# Patient Record
Sex: MALE | Race: ASIAN | Employment: FULL TIME | URBAN - METROPOLITAN AREA
[De-identification: names, ages, dates, MRNs, and addresses within clinical notes are randomized per-mention and may not be internally consistent; named-entity substitution may affect disease eponyms.]

---

## 2018-10-17 ENCOUNTER — HOSPITAL PATHOLOGY (OUTPATIENT)
Dept: OTHER | Facility: CLINIC | Age: 58
End: 2018-10-17

## 2018-10-18 LAB — COPATH REPORT: NORMAL

## 2020-11-03 ENCOUNTER — HOSPITAL ENCOUNTER (EMERGENCY)
Facility: CLINIC | Age: 60
Discharge: HOME | End: 2020-11-03
Attending: EMERGENCY MEDICINE | Admitting: EMERGENCY MEDICINE
Payer: COMMERCIAL

## 2020-11-03 VITALS
HEART RATE: 95 BPM | RESPIRATION RATE: 16 BRPM | DIASTOLIC BLOOD PRESSURE: 85 MMHG | OXYGEN SATURATION: 98 % | TEMPERATURE: 97.8 F | SYSTOLIC BLOOD PRESSURE: 125 MMHG

## 2020-11-03 DIAGNOSIS — K59.00 CONSTIPATION, UNSPECIFIED CONSTIPATION TYPE: ICD-10-CM

## 2020-11-03 PROCEDURE — 99283 EMERGENCY DEPT VISIT LOW MDM: CPT

## 2020-11-03 PROCEDURE — 250N000013 HC RX MED GY IP 250 OP 250 PS 637: Performed by: PHYSICIAN ASSISTANT

## 2020-11-03 RX ADMIN — DOCUSATE SODIUM 286 ML: 50 LIQUID ORAL at 16:46

## 2020-11-03 ASSESSMENT — ENCOUNTER SYMPTOMS
CHILLS: 0
CONSTIPATION: 1
BLOOD IN STOOL: 0
ABDOMINAL PAIN: 1
FEVER: 0

## 2020-11-03 NOTE — ED PROVIDER NOTES
History     Chief Complaint:  Constipation    HPI   Chu Harvey is a 60 year old male with a history of type 2 DM who presents for the evaluation of decreased stool output. He reports no BM for the last 5 days. He has been drinking more water and milk but has not taken anything over the counter. He notes abdominal cramping today. He denies any history of similar symptoms. Also denies fever, chills, bloody stool, or previous abdominal surgery.    Allergies:  No Known Drug Allergies    Medications:    Glucophage  Lipitor    Past Medical History:    Involutional ptosis of left eyelid  Sebaceous cyst  Cervical radiculopathy  Hypercholesterolemia  Thyroid nodule  Type 2 diabetes mellitus  Cervical disc herniation  Phimosis    Past Surgical History:    Circumcision    Family History:    No past pertinent family history.    Social History:  The patient was accompanied to the ED by wife.  Smoking Status: Former Smoker   Quit in 2018  Smokeless Tobacco: Never Used  Alcohol Use: Neative  Drug Use: Negative  Marital Status:       Review of Systems   Constitutional: Negative for chills and fever.   Gastrointestinal: Positive for abdominal pain (Cramping) and constipation. Negative for blood in stool.   All other systems reviewed and are negative.    Physical Exam     Patient Vitals for the past 24 hrs:   BP Temp Temp src Pulse Resp SpO2   11/03/20 1352 125/85 97.8  F (36.6  C) Oral 95 16 98 %       Physical Exam  Vitals signs and nursing note reviewed.   Eyes:      General: No scleral icterus.     Conjunctiva/sclera: Conjunctivae normal.   Cardiovascular:      Rate and Rhythm: Regular rhythm. Normal Rate.     Pulses: Normal pulses.      Heart sounds: Normal heart sounds.   Pulmonary:      Effort: Pulmonary effort is normal.      Breath sounds: Normal breath sounds.   Abdominal:      General: Abdomen is flat. Bowel sounds are normal.      Palpations: Abdomen is soft.      Tenderness: There is no abdominal tenderness.  "  Musculoskeletal: Normal range of motion.      Right lower leg: No edema.      Left lower leg: No edema.   Skin:     General: Skin is warm and dry.   Neurological:      Mental Status: Responds appropriately to questions.   Psychiatric:         Mood and Affect: Mood normal.         Behavior: Behavior normal.     Emergency Department Course     Interventions:  1646 Waterproof Lady Enema.     Emergency Department Course:  Past medical records, nursing notes, and vitals reviewed.    1625: I performed an exam of the patient as documented above.     1708: I rechecked the patient. He reports having a \"large\" BM and notes full resolution in his symptoms.  I discussed the treatment plan with the patient and spouse. They expressed understanding of this plan and consented to discharge. They will be discharged home with instructions for care and follow up. In addition, the patient will return to the emergency department if their symptoms persist, worsen, if new symptoms arise or if there is any concern.  All questions were answered.    Impression & Plan     Medical Decision Making:  Chu Harvey is a 60 year old male with a history of type 2 DM who presents for the evaluation of decreased stool output without signs of serious intraabdominal problems. Signs and symptoms are consistent with constipation. There are no signs at this point for a need for rectal disimpaction. The patient continues to have normal oral intake. There are no signs to suggest intraabdominal catastrophe. While in the ED, the patient was given a pink lady enema with significant stool output and resolution of his symptoms therefore advanced imaging was deferred at this time. The patient was discharged home with outpatient recommendations. Strict return precautions were discussed including fever, severe abdominal pain, unable to tolerate oral intake, or any other medical concerns. All questions and concerns were addressed prior to discharge.     Diagnosis:    " ICD-10-CM    1. Constipation, unspecified constipation type  K59.00        Disposition:  Discharged to home.    Discharge Medications:  New Prescriptions    No medications on file       Scribe Disclosure:  I, Peg Oscar, am serving as a scribe at 4:27 PM on 11/3/2020 to document services personally performed by Rosemary Higuera PA-C based on my observations and the provider's statements to me.            Rosemary Higuera PA-C  11/03/20 1716

## 2020-11-03 NOTE — ED AVS SNAPSHOT
[facility] Emergency Dept  [address]  [facility] [address]  Phone: [phone]  Fax: [phone]                                    Chu Harvey   MRN: [id number]    Department: [facility] Emergency Dept   Date of Visit: 11/3/2020           After Visit Summary Signature Page    I have received my discharge instructions, and my questions have been answered. I have discussed any challenges I see with this plan with the nurse or doctor.    ..........................................................................................................................................  Patient/Patient Representative Signature      ..........................................................................................................................................  Patient Representative Print Name and Relationship to Patient    ..................................................               ................................................  Date                                   Time    ..........................................................................................................................................  Reviewed by Signature/Title    ...................................................              ..............................................  Date                                               Time          22EPIC Rev 08/18

## 2020-11-03 NOTE — DISCHARGE INSTRUCTIONS
Please review attached instructions. You may use Metamucil daily.     Return to the ED if you experience severe abdominal pain, fever, blood in your stool, unable to tolerate oral intake, or any other medical concerns.

## 2020-12-05 ENCOUNTER — NURSE TRIAGE (OUTPATIENT)
Dept: NURSING | Facility: CLINIC | Age: 60
End: 2020-12-05

## 2020-12-06 NOTE — TELEPHONE ENCOUNTER
Caller is wife; states patient  felt very weak; BP 80/  and his heart was beating very fast; right now  BP is 107 and  and feels slightly better; similar episode last evening but became diaphoretic and  weak during  Episode; Has mild chest pain   Caller is instructed to have patient lay supine and call 911    Reason for Disposition    [1] Systolic BP < 90 AND [2] dizzy, lightheaded, or weak    Protocols used: LOW BLOOD PRESSURE-A-AH

## 2020-12-07 ENCOUNTER — HOSPITAL ENCOUNTER (INPATIENT)
Facility: CLINIC | Age: 60
LOS: 2 days | Discharge: HOME | End: 2020-12-09
Attending: EMERGENCY MEDICINE | Admitting: STUDENT IN AN ORGANIZED HEALTH CARE EDUCATION/TRAINING PROGRAM
Payer: COMMERCIAL

## 2020-12-07 ENCOUNTER — APPOINTMENT (OUTPATIENT)
Dept: GENERAL RADIOLOGY | Facility: CLINIC | Age: 60
End: 2020-12-07
Attending: EMERGENCY MEDICINE
Payer: COMMERCIAL

## 2020-12-07 DIAGNOSIS — K92.2 GASTROINTESTINAL HEMORRHAGE, UNSPECIFIED GASTROINTESTINAL HEMORRHAGE TYPE: Primary | ICD-10-CM

## 2020-12-07 DIAGNOSIS — R07.9 CHEST PAIN, UNSPECIFIED TYPE: ICD-10-CM

## 2020-12-07 LAB
ABO + RH BLD: NORMAL
ABO + RH BLD: NORMAL
ALBUMIN SERPL-MCNC: 3.4 G/DL (ref 3.4–5)
ALP SERPL-CCNC: 39 U/L (ref 40–150)
ALT SERPL W P-5'-P-CCNC: 24 U/L (ref 0–70)
ANION GAP SERPL CALCULATED.3IONS-SCNC: 8 MMOL/L (ref 3–14)
APTT PPP: 22 SEC (ref 22–37)
AST SERPL W P-5'-P-CCNC: 15 U/L (ref 0–45)
BASOPHILS # BLD AUTO: 0.1 10E9/L (ref 0–0.2)
BASOPHILS NFR BLD AUTO: 0.7 %
BILIRUB DIRECT SERPL-MCNC: 0.1 MG/DL (ref 0–0.2)
BILIRUB SERPL-MCNC: 0.4 MG/DL (ref 0.2–1.3)
BLD GP AB SCN SERPL QL: NORMAL
BLD PROD TYP BPU: NORMAL
BLD UNIT ID BPU: 0
BLD UNIT ID BPU: 0
BLOOD BANK CMNT PATIENT-IMP: NORMAL
BLOOD PRODUCT CODE: NORMAL
BLOOD PRODUCT CODE: NORMAL
BPU ID: NORMAL
BPU ID: NORMAL
BUN SERPL-MCNC: 20 MG/DL (ref 7–30)
CALCIUM SERPL-MCNC: 8.8 MG/DL (ref 8.5–10.1)
CHLORIDE SERPL-SCNC: 102 MMOL/L (ref 94–109)
CO2 SERPL-SCNC: 26 MMOL/L (ref 20–32)
CREAT SERPL-MCNC: 0.77 MG/DL (ref 0.66–1.25)
DIFFERENTIAL METHOD BLD: ABNORMAL
EOSINOPHIL # BLD AUTO: 0.1 10E9/L (ref 0–0.7)
EOSINOPHIL NFR BLD AUTO: 0.4 %
ERYTHROCYTE [DISTWIDTH] IN BLOOD BY AUTOMATED COUNT: 13.4 % (ref 10–15)
GFR SERPL CREATININE-BSD FRML MDRD: >90 ML/MIN/{1.73_M2}
GLUCOSE SERPL-MCNC: 116 MG/DL (ref 70–99)
HCT VFR BLD AUTO: 19.7 % (ref 40–53)
HGB BLD-MCNC: 6.6 G/DL (ref 13.3–17.7)
IMM GRANULOCYTES # BLD: 0.1 10E9/L (ref 0–0.4)
IMM GRANULOCYTES NFR BLD: 1.2 %
INR PPP: 0.97 (ref 0.86–1.14)
INTERPRETATION ECG - MUSE: NORMAL
LYMPHOCYTES # BLD AUTO: 3.4 10E9/L (ref 0.8–5.3)
LYMPHOCYTES NFR BLD AUTO: 28.4 %
MAGNESIUM SERPL-MCNC: 2.4 MG/DL (ref 1.6–2.3)
MCH RBC QN AUTO: 31.1 PG (ref 26.5–33)
MCHC RBC AUTO-ENTMCNC: 33.5 G/DL (ref 31.5–36.5)
MCV RBC AUTO: 93 FL (ref 78–100)
MONOCYTES # BLD AUTO: 0.7 10E9/L (ref 0–1.3)
MONOCYTES NFR BLD AUTO: 6.1 %
NEUTROPHILS # BLD AUTO: 7.5 10E9/L (ref 1.6–8.3)
NEUTROPHILS NFR BLD AUTO: 63.2 %
NRBC # BLD AUTO: 0.4 10*3/UL
NRBC BLD AUTO-RTO: 3 /100
NUM BPU REQUESTED: 2
PLATELET # BLD AUTO: 247 10E9/L (ref 150–450)
POTASSIUM SERPL-SCNC: 3.4 MMOL/L (ref 3.4–5.3)
POTASSIUM SERPL-SCNC: 3.5 MMOL/L (ref 3.4–5.3)
PROT SERPL-MCNC: 6.4 G/DL (ref 6.8–8.8)
RBC # BLD AUTO: 2.12 10E12/L (ref 4.4–5.9)
SARS-COV-2 RNA SPEC QL NAA+PROBE: NORMAL
SODIUM SERPL-SCNC: 136 MMOL/L (ref 133–144)
SPECIMEN EXP DATE BLD: NORMAL
SPECIMEN SOURCE: NORMAL
TRANSFUSION STATUS PATIENT QL: NORMAL
TROPONIN I SERPL-MCNC: <0.015 UG/L (ref 0–0.04)
WBC # BLD AUTO: 11.9 10E9/L (ref 4–11)

## 2020-12-07 PROCEDURE — 250N000013 HC RX MED GY IP 250 OP 250 PS 637: Performed by: STUDENT IN AN ORGANIZED HEALTH CARE EDUCATION/TRAINING PROGRAM

## 2020-12-07 PROCEDURE — 93005 ELECTROCARDIOGRAM TRACING: CPT

## 2020-12-07 PROCEDURE — 86900 BLOOD TYPING SEROLOGIC ABO: CPT | Performed by: EMERGENCY MEDICINE

## 2020-12-07 PROCEDURE — 36430 TRANSFUSION BLD/BLD COMPNT: CPT

## 2020-12-07 PROCEDURE — 85730 THROMBOPLASTIN TIME PARTIAL: CPT | Performed by: EMERGENCY MEDICINE

## 2020-12-07 PROCEDURE — 85610 PROTHROMBIN TIME: CPT | Performed by: EMERGENCY MEDICINE

## 2020-12-07 PROCEDURE — 84132 ASSAY OF SERUM POTASSIUM: CPT | Performed by: STUDENT IN AN ORGANIZED HEALTH CARE EDUCATION/TRAINING PROGRAM

## 2020-12-07 PROCEDURE — 80053 COMPREHEN METABOLIC PANEL: CPT | Performed by: EMERGENCY MEDICINE

## 2020-12-07 PROCEDURE — 86850 RBC ANTIBODY SCREEN: CPT | Performed by: EMERGENCY MEDICINE

## 2020-12-07 PROCEDURE — 36415 COLL VENOUS BLD VENIPUNCTURE: CPT | Performed by: STUDENT IN AN ORGANIZED HEALTH CARE EDUCATION/TRAINING PROGRAM

## 2020-12-07 PROCEDURE — 99285 EMERGENCY DEPT VISIT HI MDM: CPT | Mod: 25

## 2020-12-07 PROCEDURE — 250N000011 HC RX IP 250 OP 636: Performed by: EMERGENCY MEDICINE

## 2020-12-07 PROCEDURE — U0003 INFECTIOUS AGENT DETECTION BY NUCLEIC ACID (DNA OR RNA); SEVERE ACUTE RESPIRATORY SYNDROME CORONAVIRUS 2 (SARS-COV-2) (CORONAVIRUS DISEASE [COVID-19]), AMPLIFIED PROBE TECHNIQUE, MAKING USE OF HIGH THROUGHPUT TECHNOLOGIES AS DESCRIBED BY CMS-2020-01-R: HCPCS | Performed by: EMERGENCY MEDICINE

## 2020-12-07 PROCEDURE — C9113 INJ PANTOPRAZOLE SODIUM, VIA: HCPCS | Performed by: EMERGENCY MEDICINE

## 2020-12-07 PROCEDURE — 96374 THER/PROPH/DIAG INJ IV PUSH: CPT

## 2020-12-07 PROCEDURE — C9803 HOPD COVID-19 SPEC COLLECT: HCPCS

## 2020-12-07 PROCEDURE — 86923 COMPATIBILITY TEST ELECTRIC: CPT | Performed by: EMERGENCY MEDICINE

## 2020-12-07 PROCEDURE — 86901 BLOOD TYPING SEROLOGIC RH(D): CPT | Performed by: EMERGENCY MEDICINE

## 2020-12-07 PROCEDURE — 99207 PR CDG-MDM COMPONENT: MEETS HIGH - UP CODED: CPT | Performed by: STUDENT IN AN ORGANIZED HEALTH CARE EDUCATION/TRAINING PROGRAM

## 2020-12-07 PROCEDURE — 84484 ASSAY OF TROPONIN QUANT: CPT | Performed by: EMERGENCY MEDICINE

## 2020-12-07 PROCEDURE — 120N000001 HC R&B MED SURG/OB

## 2020-12-07 PROCEDURE — P9016 RBC LEUKOCYTES REDUCED: HCPCS | Performed by: EMERGENCY MEDICINE

## 2020-12-07 PROCEDURE — 82248 BILIRUBIN DIRECT: CPT | Performed by: EMERGENCY MEDICINE

## 2020-12-07 PROCEDURE — 83735 ASSAY OF MAGNESIUM: CPT | Performed by: STUDENT IN AN ORGANIZED HEALTH CARE EDUCATION/TRAINING PROGRAM

## 2020-12-07 PROCEDURE — 85025 COMPLETE CBC W/AUTO DIFF WBC: CPT | Performed by: EMERGENCY MEDICINE

## 2020-12-07 PROCEDURE — 99223 1ST HOSP IP/OBS HIGH 75: CPT | Mod: AI | Performed by: STUDENT IN AN ORGANIZED HEALTH CARE EDUCATION/TRAINING PROGRAM

## 2020-12-07 PROCEDURE — 71045 X-RAY EXAM CHEST 1 VIEW: CPT

## 2020-12-07 RX ORDER — LIDOCAINE 40 MG/G
CREAM TOPICAL
Status: DISCONTINUED | OUTPATIENT
Start: 2020-12-07 | End: 2020-12-09 | Stop reason: HOSPADM

## 2020-12-07 RX ORDER — ONDANSETRON 4 MG/1
4 TABLET, ORALLY DISINTEGRATING ORAL EVERY 6 HOURS PRN
Status: DISCONTINUED | OUTPATIENT
Start: 2020-12-07 | End: 2020-12-09 | Stop reason: HOSPADM

## 2020-12-07 RX ORDER — SODIUM CHLORIDE 9 MG/ML
INJECTION, SOLUTION INTRAVENOUS CONTINUOUS
Status: DISCONTINUED | OUTPATIENT
Start: 2020-12-07 | End: 2020-12-09 | Stop reason: HOSPADM

## 2020-12-07 RX ORDER — ATORVASTATIN CALCIUM 20 MG/1
20 TABLET, FILM COATED ORAL
COMMUNITY

## 2020-12-07 RX ORDER — POTASSIUM CHLORIDE 1500 MG/1
40 TABLET, EXTENDED RELEASE ORAL ONCE
Status: COMPLETED | OUTPATIENT
Start: 2020-12-07 | End: 2020-12-07

## 2020-12-07 RX ORDER — METFORMIN HCL 500 MG
500 TABLET, EXTENDED RELEASE 24 HR ORAL 2 TIMES DAILY WITH MEALS
COMMUNITY

## 2020-12-07 RX ORDER — ONDANSETRON 2 MG/ML
4 INJECTION INTRAMUSCULAR; INTRAVENOUS EVERY 6 HOURS PRN
Status: DISCONTINUED | OUTPATIENT
Start: 2020-12-07 | End: 2020-12-09 | Stop reason: HOSPADM

## 2020-12-07 RX ORDER — ACETAMINOPHEN 325 MG/1
650 TABLET ORAL EVERY 4 HOURS PRN
Status: DISCONTINUED | OUTPATIENT
Start: 2020-12-07 | End: 2020-12-09 | Stop reason: HOSPADM

## 2020-12-07 RX ORDER — ATORVASTATIN CALCIUM 20 MG/1
20 TABLET, FILM COATED ORAL
Status: DISCONTINUED | OUTPATIENT
Start: 2020-12-08 | End: 2020-12-09 | Stop reason: HOSPADM

## 2020-12-07 RX ADMIN — PANTOPRAZOLE SODIUM 40 MG: 40 INJECTION, POWDER, FOR SOLUTION INTRAVENOUS at 16:54

## 2020-12-07 RX ADMIN — POTASSIUM CHLORIDE 40 MEQ: 1500 TABLET, EXTENDED RELEASE ORAL at 21:11

## 2020-12-07 SDOH — HEALTH STABILITY: MENTAL HEALTH: HOW OFTEN DO YOU HAVE 6 OR MORE DRINKS ON ONE OCCASION?: NEVER

## 2020-12-07 SDOH — HEALTH STABILITY: MENTAL HEALTH: HOW MANY STANDARD DRINKS CONTAINING ALCOHOL DO YOU HAVE ON A TYPICAL DAY?: NOT ASKED

## 2020-12-07 SDOH — HEALTH STABILITY: MENTAL HEALTH: HOW OFTEN DO YOU HAVE A DRINK CONTAINING ALCOHOL?: NEVER

## 2020-12-07 ASSESSMENT — ACTIVITIES OF DAILY LIVING (ADL)
PATIENT_/_FAMILY_COMMUNICATION_STYLE: SPOKEN LANGUAGE (NON-ENGLISH)
DRESSING/BATHING_DIFFICULTY: NO
ADLS_ACUITY_SCORE: 17
HEARING_DIFFICULTY_OR_DEAF: NO
CONCENTRATING,_REMEMBERING_OR_MAKING_DECISIONS_DIFFICULTY: NO
HEARING_DIFFICULTY_OR_DEAF: NO
DIFFICULTY_EATING/SWALLOWING: NO
TOILETING_ISSUES: NO
HEARING_DIFFICULTY_OR_DEAF: NO
WEAR_GLASSES_OR_BLIND: OTHER (SEE COMMENTS)
DOING_ERRANDS_INDEPENDENTLY_DIFFICULTY: NO
FALL_HISTORY_WITHIN_LAST_SIX_MONTHS: NO
DIFFICULTY_COMMUNICATING: NO
INTERPRETER_SERVICES_OFFERED_TO_THE_PATIENT: YES
WALKING_OR_CLIMBING_STAIRS_DIFFICULTY: NO
PATIENT_/_FAMILY_COMMUNICATION_STYLE: SPOKEN LANGUAGE (NON-ENGLISH)

## 2020-12-07 ASSESSMENT — ENCOUNTER SYMPTOMS
FEVER: 0
VOMITING: 0
ABDOMINAL PAIN: 0
PALPITATIONS: 1
COUGH: 0
LIGHT-HEADEDNESS: 0
SHORTNESS OF BREATH: 1
NAUSEA: 0
BLOOD IN STOOL: 1
NUMBNESS: 0
WEAKNESS: 1
CHILLS: 0

## 2020-12-07 ASSESSMENT — MIFFLIN-ST. JEOR: SCORE: 1454.03

## 2020-12-07 NOTE — ED PROVIDER NOTES
History   Chief Complaint:  Generalized Weakness and Chest Pain    The patient's spouse was acting as  during the interview.     TESS Harvey is a 60 year old male, with a history of DM II and hyperlipidemia, who presents to the ED for evaluation of generalized weakness and chest pain. The patient reports he saw black hued stools for the past week and went into his clinic today to be evaluated. When he was in the clinic, he also stated he had some exertional chest pain along with increasing weakness and shortness of breath. He was then sent to the emergency department for further evaluation. Here in the emergency department, the patient states his chest pain began three days ago and is intermittent in nature. It is localized on the left side of his chest and does not radiate elsewhere. The patient also mentions, when he was going up a few stairs, he had some increased shortness of breath, noticed palpitations, and had some minor chest pain as well. He has not had symptoms like this in the past. The patient's black stools have been going on for a week as well. He has not had an endoscopy in the past nor has he had GI symptoms like this in the past. The patient is COVID-19 tested every Tuesday for his work and has been negative every time. The patient denies any hematemesis, fever chills, cough, abdominal pain, nausea, vomiting, numbness, lightheadedness, or any other acute symptoms.     Allergies:  No known drug allergies    Medications:    Metformin  Lipitor  Aspirin    Past Medical History:    Hyperlipidemia  DM II    Past Surgical History:    Circumcision    Family History:    Lung cancer    Social History:  Smoking status: Never  Alcohol use: Not currently  Drug use: Not currently  PCP: [facility]  Marital Status:   [2]    Review of Systems   Constitutional: Negative for chills and fever.   Respiratory: Positive for shortness of breath. Negative for cough.     Cardiovascular: Positive for chest pain and palpitations.   Gastrointestinal: Positive for blood in stool. Negative for abdominal pain, nausea and vomiting.   Neurological: Positive for weakness. Negative for light-headedness and numbness.   All other systems reviewed and are negative.    Physical Exam     Patient Vitals for the past 24 hrs:   BP Temp Temp src Pulse Resp SpO2   12/07/20 1921 122/72 98  F (36.7  C) Oral 91 16 99 %   12/07/20 1903 126/70 98.1  F (36.7  C) Oral 90 14 99 %   12/07/20 1852 135/71 98.1  F (36.7  C) Oral 91 18 99 %   12/07/20 1830 138/76 -- -- 89 14 99 %   12/07/20 1800 121/71 -- -- 90 14 99 %   12/07/20 1730 122/71 -- -- 89 18 100 %   12/07/20 1700 117/71 -- -- 88 14 99 %   12/07/20 1632 137/77 98.1  F (36.7  C) Oral 90 18 99 %       Physical Exam  Vitals signs and nursing note reviewed.   Constitutional:       Comments: Pale appearing   HENT:      Head: Normocephalic.      Nose: Nose normal.      Mouth/Throat:      Mouth: Mucous membranes are moist.      Pharynx: Oropharynx is clear.   Eyes:      Pupils: Pupils are equal, round, and reactive to light.   Cardiovascular:      Rate and Rhythm: Normal rate and regular rhythm.   Pulmonary:      Effort: Pulmonary effort is normal.      Breath sounds: Normal breath sounds.   Abdominal:      General: Abdomen is flat.   Musculoskeletal: Normal range of motion.   Skin:     General: Skin is warm.      Capillary Refill: Capillary refill takes less than 2 seconds.   Neurological:      General: No focal deficit present.      Mental Status: He is alert.   Psychiatric:         Mood and Affect: Mood normal.           Emergency Department Course   ECG (16:54:49):  Rate 87 bpm. GA interval 154. QRS duration 90. QT/QTc 358/430. P-R-T axes 68 61 52. Normal sins rhythm. Normal ECG. Interpreted at 1704 by Steven Puga MD.    Imaging:  Radiology findings were communicated with the patient who voiced understanding of the findings.    XR Chest,  portable, 1 view:  No acute findings. The lungs are clear and there are no   pleural effusions. Normal heart size.    Imaging independently reviewed and agree with radiologist interpretation.      Laboratory:  Laboratory findings were communicated with the patient who voiced understanding of the findings.    CBC: WBC 11.9 (H), HGB 6.6 (LL), o/w WNL ()    CMP:  (H), Protein Total 6.4 (L), ALKPHOS 39 (L), o/w WNL (Creatinine 0.77)    PTT: 22    INR: 0.97    Troponin (Collected 1644): <0.015     Bilirubin Direct: 0.1    AB0/Rh type and screen: AB Rh+. Antibodies Negative    COVID-19 Virus PCR: Pending    Interventions:  1654: Protonix 40 mg IV    Emergency Department Course:  Past medical records, nursing notes, and vitals reviewed.    1649 I performed an exam of the patient as documented above.     EKG obtained in the ED, see results above.   IV was inserted and blood was drawn for laboratory testing, results above.  The patient was sent for a chest x-ray while in the emergency department, results above.     1730 I rechecked the patient and discussed the results of his workup thus far.     Findings and plan explained to the Patient who consents to admission. Discussed the patient with Dr. Ball, who will admit the patient to a medical bed for further monitoring, evaluation, and treatment.    I personally reviewed the laboratory and imaging results with the Patient and answered all related questions prior to admission.     Impression & Plan   Covid-19  Chu Harvey was evaluated during a global COVID-19 pandemic, which necessitated consideration that the patient might be at risk for infection with the SARS-CoV-2 virus that causes COVID-19.   Applicable protocols for evaluation were followed during the patient's care.   COVID-19 was considered as part of the patient's evaluation. The plan for testing is:  a test was obtained during this visit.    Medical Decision Making:  Patient presents with black stool  for a number of days.  Also some vague chest pain that now has resolved.  EKG and troponin are negative.  Doubt pulmonary embolism clinical suspicions are for shortness of breath with exertion likely secondary to proximal pretty significant anemia.  Prior hemoglobins have been normal today is only 6.6 with a normal MCV.  Patient was ordered 2 units of blood I did discuss with him risks and benefits of blood transfusion he did agree to this and was signed consent patient be admitted for IV transfusion and GI consultation for likely endoscopy.  Care discussed with the hospitalist.    Diagnosis:    ICD-10-CM    1. Gastrointestinal hemorrhage, unspecified gastrointestinal hemorrhage type  K92.2 CBC with platelets differential     ABO/Rh type and screen     Asymptomatic COVID-19 Virus (Coronavirus) by PCR     Blood component     Blood component     Blood component     Blood component   2. Chest pain, unspecified type  R07.9        Disposition:  Admitted to a medical bed.    Scribe Disclosure:  Dave LOBO, am serving as a scribe at 4:49 PM on 12/7/2020 to document services personally performed by Steven Puga MD based on my observations and the provider's statements to me.        Steven Puga MD  12/07/20 7470

## 2020-12-07 NOTE — ED TRIAGE NOTES
Patient comes in from a clinic for eval of chest pain.  Patient has a history of low hemoglobin and was following up for SOB and weakness on exertion.  Patient states he also noticed some black stools since Friday.  NSR on EKG for EMS.  Clinic sent patient here for chest pain rule out.

## 2020-12-07 NOTE — ED NOTES
Bed: ED21  Expected date:   Expected time:   Means of arrival:   Comments:  E2  60M Possible GI bleed  No covid  1286

## 2020-12-07 NOTE — ED NOTES
DATE:  12/7/2020   TIME OF RECEIPT FROM LAB:  5:31 PM  LAB TEST:  Hemoglobin  LAB VALUE:  6.6  RESULTS GIVEN WITH READ-BACK TO (PROVIDER):  Verbally reported to Dr. Puga  TIME LAB VALUE REPORTED TO PROVIDER:   5:31 PM

## 2020-12-08 ENCOUNTER — APPOINTMENT (OUTPATIENT)
Dept: INTERPRETER SERVICES | Facility: CLINIC | Age: 60
End: 2020-12-08
Payer: COMMERCIAL

## 2020-12-08 LAB
ANION GAP SERPL CALCULATED.3IONS-SCNC: 2 MMOL/L (ref 3–14)
BUN SERPL-MCNC: 14 MG/DL (ref 7–30)
CALCIUM SERPL-MCNC: 8.1 MG/DL (ref 8.5–10.1)
CHLORIDE SERPL-SCNC: 108 MMOL/L (ref 94–109)
CO2 SERPL-SCNC: 30 MMOL/L (ref 20–32)
CREAT SERPL-MCNC: 0.81 MG/DL (ref 0.66–1.25)
GFR SERPL CREATININE-BSD FRML MDRD: >90 ML/MIN/{1.73_M2}
GLUCOSE SERPL-MCNC: 119 MG/DL (ref 70–99)
HGB BLD-MCNC: 8.1 G/DL (ref 13.3–17.7)
HGB BLD-MCNC: 8.5 G/DL (ref 13.3–17.7)
HGB BLD-MCNC: 8.7 G/DL (ref 13.3–17.7)
HGB BLD-MCNC: 8.7 G/DL (ref 13.3–17.7)
LABORATORY COMMENT REPORT: NORMAL
MAGNESIUM SERPL-MCNC: 2.5 MG/DL (ref 1.6–2.3)
POTASSIUM SERPL-SCNC: 3.8 MMOL/L (ref 3.4–5.3)
POTASSIUM SERPL-SCNC: 3.9 MMOL/L (ref 3.4–5.3)
SARS-COV-2 RNA SPEC QL NAA+PROBE: NEGATIVE
SODIUM SERPL-SCNC: 140 MMOL/L (ref 133–144)
SPECIMEN SOURCE: NORMAL
UPPER GI ENDOSCOPY: NORMAL

## 2020-12-08 PROCEDURE — 45382 COLONOSCOPY W/CONTROL BLEED: CPT | Performed by: INTERNAL MEDICINE

## 2020-12-08 PROCEDURE — 120N000001 HC R&B MED SURG/OB

## 2020-12-08 PROCEDURE — C9113 INJ PANTOPRAZOLE SODIUM, VIA: HCPCS | Performed by: STUDENT IN AN ORGANIZED HEALTH CARE EDUCATION/TRAINING PROGRAM

## 2020-12-08 PROCEDURE — 250N000011 HC RX IP 250 OP 636: Performed by: STUDENT IN AN ORGANIZED HEALTH CARE EDUCATION/TRAINING PROGRAM

## 2020-12-08 PROCEDURE — 36415 COLL VENOUS BLD VENIPUNCTURE: CPT | Performed by: STUDENT IN AN ORGANIZED HEALTH CARE EDUCATION/TRAINING PROGRAM

## 2020-12-08 PROCEDURE — 85018 HEMOGLOBIN: CPT | Performed by: STUDENT IN AN ORGANIZED HEALTH CARE EDUCATION/TRAINING PROGRAM

## 2020-12-08 PROCEDURE — 36415 COLL VENOUS BLD VENIPUNCTURE: CPT | Performed by: INTERNAL MEDICINE

## 2020-12-08 PROCEDURE — 250N000011 HC RX IP 250 OP 636: Performed by: INTERNAL MEDICINE

## 2020-12-08 PROCEDURE — 0W3P8ZZ CONTROL BLEEDING IN GASTROINTESTINAL TRACT, VIA NATURAL OR ARTIFICIAL OPENING ENDOSCOPIC: ICD-10-PCS | Performed by: INTERNAL MEDICINE

## 2020-12-08 PROCEDURE — 250N000009 HC RX 250: Performed by: INTERNAL MEDICINE

## 2020-12-08 PROCEDURE — 250N000013 HC RX MED GY IP 250 OP 250 PS 637: Performed by: STUDENT IN AN ORGANIZED HEALTH CARE EDUCATION/TRAINING PROGRAM

## 2020-12-08 PROCEDURE — 85018 HEMOGLOBIN: CPT | Performed by: INTERNAL MEDICINE

## 2020-12-08 PROCEDURE — G0500 MOD SEDAT ENDO SERVICE >5YRS: HCPCS | Performed by: INTERNAL MEDICINE

## 2020-12-08 PROCEDURE — 80048 BASIC METABOLIC PNL TOTAL CA: CPT | Performed by: STUDENT IN AN ORGANIZED HEALTH CARE EDUCATION/TRAINING PROGRAM

## 2020-12-08 PROCEDURE — 84132 ASSAY OF SERUM POTASSIUM: CPT | Performed by: INTERNAL MEDICINE

## 2020-12-08 PROCEDURE — 250N000013 HC RX MED GY IP 250 OP 250 PS 637: Performed by: INTERNAL MEDICINE

## 2020-12-08 PROCEDURE — 83735 ASSAY OF MAGNESIUM: CPT | Performed by: STUDENT IN AN ORGANIZED HEALTH CARE EDUCATION/TRAINING PROGRAM

## 2020-12-08 PROCEDURE — 258N000003 HC RX IP 258 OP 636: Performed by: STUDENT IN AN ORGANIZED HEALTH CARE EDUCATION/TRAINING PROGRAM

## 2020-12-08 PROCEDURE — 99232 SBSQ HOSP IP/OBS MODERATE 35: CPT | Performed by: INTERNAL MEDICINE

## 2020-12-08 RX ORDER — PANTOPRAZOLE SODIUM 40 MG/1
40 TABLET, DELAYED RELEASE ORAL
Status: DISCONTINUED | OUTPATIENT
Start: 2020-12-08 | End: 2020-12-09 | Stop reason: HOSPADM

## 2020-12-08 RX ORDER — FENTANYL CITRATE 50 UG/ML
INJECTION, SOLUTION INTRAMUSCULAR; INTRAVENOUS PRN
Status: DISCONTINUED | OUTPATIENT
Start: 2020-12-08 | End: 2020-12-08 | Stop reason: HOSPADM

## 2020-12-08 RX ADMIN — SODIUM CHLORIDE: 9 INJECTION, SOLUTION INTRAVENOUS at 22:47

## 2020-12-08 RX ADMIN — ATORVASTATIN CALCIUM 20 MG: 20 TABLET, FILM COATED ORAL at 17:16

## 2020-12-08 RX ADMIN — PANTOPRAZOLE SODIUM 40 MG: 40 INJECTION, POWDER, FOR SOLUTION INTRAVENOUS at 05:43

## 2020-12-08 RX ADMIN — SODIUM CHLORIDE: 9 INJECTION, SOLUTION INTRAVENOUS at 10:27

## 2020-12-08 RX ADMIN — SODIUM CHLORIDE: 9 INJECTION, SOLUTION INTRAVENOUS at 01:06

## 2020-12-08 RX ADMIN — PANTOPRAZOLE SODIUM 40 MG: 40 TABLET, DELAYED RELEASE ORAL at 17:16

## 2020-12-08 ASSESSMENT — ACTIVITIES OF DAILY LIVING (ADL)
ADLS_ACUITY_SCORE: 17
ADLS_ACUITY_SCORE: 16
ADLS_ACUITY_SCORE: 17
ADLS_ACUITY_SCORE: 14
ADLS_ACUITY_SCORE: 16

## 2020-12-08 NOTE — CONSULTS
[facility]  Gastroenterology Consultation         Chu Harvey  [id number]  [facility] [address]  60 year old male    Admission Date/Time: 12/7/2020  Primary Care Provider: Rosalie Cope  Referring / Attending Physician:  Dr.Jessalyn Ball    We were asked to see the patient in consultation by Dr. Tawnya Ball for evaluation of GI bleed.      CC: intermittent chest pain, shortness of breast and weakness    HPI:  Chu Harvey is a 60 year old male who has a past medical history of type II DM, HLD, tobacco use presented to ED on 12/7/2020 with 4-5 days of intermittent chest pain, shortness of breast and weakness. He reports that he noted dark tarry stools during this time as well. He denies diarrhea, bright red blood in stool, nausea, vomiting or hematemesis. He has had no fever, chills, abdominal pain, or known sick exposures. He reports chest pain occurs with exertion, does note extreme sob with just a few steps. No prior similar history. Denies h/o heartburn, PUD or H pylori. Has no history of prior EGD or colonoscopy. Does not drink alcohol. Denies NSAID use or anticoagulation or antiplatlets.    VSS. COVID negative. Hemoglobin 6.6 in ED -> 8.7->8.5 (after 2 units of PRBC). WBC 11.9, Platelets 247, INR 0.97. Chest xray unremarkable    ROS: A comprehensive ten point review of systems was negative aside from those in mentioned in the HPI.      PAST MED HX:  I have reviewed this patient's medical history and updated it with pertinent information if needed.   Past Medical History:   Diagnosis Date     Diabetes mellitus, type 2 (H)      HLD (hyperlipidemia)      Personal history of tobacco use, presenting hazards to health     quit 2 years ago       MEDICATIONS:   Prior to Admission Medications   Prescriptions Last Dose Informant Patient Reported? Taking?   atorvastatin (LIPITOR) 20 MG tablet 12/7/2020 at lunch Spouse/Significant Other Yes Yes   Sig: Take 20 mg by  mouth daily (with dinner)   metFORMIN (GLUCOPHAGE-XR) 500 MG 24 hr tablet 2020 at lunch 1st Spouse/Significant Other Yes Yes   Sig: Take 500 mg by mouth 2 times daily (with meals)      Facility-Administered Medications: None       ALLERGIES: No Known Allergies    SOCIAL HISTORY:  Social History     Tobacco Use     Smoking status: Former Smoker     Quit date: 2018     Years since quittin.9     Smokeless tobacco: Never Used     Tobacco comment: up to 2ppd for 30 years   Substance Use Topics     Alcohol use: Never     Frequency: Never     Binge frequency: Never     Drug use: Not Currently       FAMILY HISTORY:  No family history on file.    PHYSICAL EXAM:   General  Alert, oriented and comfortable  Vital Signs with Ranges  Temp: 98.1  F (36.7  C) Temp src: Oral BP: 99/57 Pulse: 78   Resp: 14 SpO2: 96 % O2 Device: None (Room air)    I/O last 3 completed shifts:  In: 500 [P.O.:200]  Out: -     Constitutional: no distress and pale   Cardiovascular: negative, PMI normal. No lifts, heaves, or thrills. RRR. No murmurs, clicks gallops or rub  Respiratory: negative, Percussion normal. Good diaphragmatic excursion. Lungs clear  Head: Normocephalic. No masses, lesions, tenderness or abnormalities  Neck: Neck supple. No adenopathy. Thyroid symmetric, normal size,, Carotids without bruits.  Abdomen: Abdomen soft, non-tender. BS normal. No masses, organomegaly          ADDITIONAL COMMENTS:   I reviewed the patient's new clinical lab test results.   Recent Labs   Lab Test 20  0630 20  02020  1644   WBC  --   --  11.9*   HGB 8.5* 8.7* 6.6*   MCV  --   --  93   PLT  --   --  247   INR  --   --  0.97     Recent Labs   Lab Test 20  0630 20  0209 20  1644   POTASSIUM 3.8 3.9 3.4 3.5   CHLORIDE 108  --   --  102   CO2 30  --   --  26   BUN 14  --   --  20   ANIONGAP 2*  --   --  8     Recent Labs   Lab Test 20  1644   ALBUMIN 3.4   BILITOTAL 0.4   ALT 24   AST 15       I  reviewed the patient's new imaging results.        CONSULTATION ASSESSMENT AND PLAN:    Chu Harvey is a pleasant 60 year old male with melena and symptomatic anemia. GI consulted on 12/7/2020.     Active Problems:    Gastrointestinal hemorrhage, unspecified gastrointestinal hemorrhage type    Melena    Acute blood loss anemia    Patient has had presenting hemoglobin at 6.6, baseline 15. Stools melenotic. Symptomatic with sob, chest pain on exertion. Likely has hemorrhagic gastritis vs PUD vs neoplastic cause vs lower GI bleed.   -- Plan EGD today  -- NPO  -- Serial hemoglobin  -- Contiue with IV pantoprazole 40 mg BID  -- If negative EGD will plan colonoscopy tomorrow        [facility].  Office: [phone]  Cell : [phone] (Dr. Adan)  Cell: [phone] (Ashley Sánchez PA-C)

## 2020-12-08 NOTE — PHARMACY-ADMISSION MEDICATION HISTORY
"Pharmacy Medication History  Admission medication history interview status for the 12/7/2020  admission is complete. See EPIC admission navigator for prior to admission medications       Medication history sources: Care Everywhere and Patients wife and Patient  Location of interview: Phone  Adherence Assessment: Good    Significant changes made to the medication list:  Added Lipitor and Metformin.      Additional medication history information:   none    Medication reconciliation completed by provider prior to medication history? No    Time spent in this activity: 20\"      Prior to Admission medications    Medication Sig Last Dose Taking? Auth Provider   atorvastatin (LIPITOR) 20 MG tablet Take 20 mg by mouth daily (with dinner) 12/7/2020 at lunch Yes Unknown, Entered By History   metFORMIN (GLUCOPHAGE-XR) 500 MG 24 hr tablet Take 500 mg by mouth 2 times daily (with meals) 12/7/2020 at lunch 1st Yes Unknown, Entered By History       The information provided in this note is only as accurate as the sources available at the time of the update(s).     "

## 2020-12-08 NOTE — PLAN OF CARE
Cognitive Concerns/ Orientation : AOx4  Bilingual in Cantonese/English   BEHAVIOR & AGGRESSION TOOL COLOR: GREEN  CIWA SCORE: NA   ABNL VS/O2: WDL on RA  MOBILITY: SBA-FR  PAIN MANAGMENT: denies  DIET: NPO  BOWEL/BLADDER: Continent  ABNL LAB/B units overnight last night. Hgb 8.5, 8.1 today  DRAIN/DEVICES: IV on R arm infusing  ml/hr  TELEMETRY RHYTHM: NSR  SKIN: intact  TESTS/PROCEDURES: EGD today. No further plans for colonoscopy. Ulcer cauterized.   D/C DAY/GOALS/PLACE: possible home tomorrow.  OTHER IMPORTANT INFO:   --denies all pain, reports improvements since yesterday   --serial hemoglobins  --Protonix started this afternoon

## 2020-12-08 NOTE — H&P
[facility]    History and Physical - Hospitalist Service       Date of Admission:  12/7/2020    Assessment & Plan   Chu Harvey is a 60 year old male with past medical history significant for type II DM, HLD, tobacco use admitted on 12/7/2020 with likely upper GI bleed and acute anemia.     Acute blood loss anemia   Probable Upper GI bleed   Pt presented from clinic today with exertional chest pain, shortness of breath and weakness. He also endorsed black colored stools for the past week. In the ED his hemoglobin was found to be 6.6 (baseline is 14-15). Presentation likely 2/2 bleeding gastric or duodenal ulcer, though malignancy is on the differential given his smoking hx. Pt received 2 units of blood in the ED. He is hemodynamically stable.   - IV PPI BID   - Q6H hemoglobin   - Transfuse PRBCs for hgb <7.0   - GI consulted   - NPO at midnight for EGD     Chest pain   EKG is non-ischemic and troponin is negative. Concern for angina precipitated by acute anemia. He does not have known CAD and had a negative stress test back on Jan 2019 but has hx of heavy tobacco use, HLD, and type II DM.   - Consider outpatient follow-up/repeat stress testing when GI bleeding has resolved     Probable peripheral vascular disease/claudication  Pt describes what sounds like vascular claudication in his legs bilaterally that has been present even prior to the last 4-5 days. At risk for PVD given hx of tobacco use and type II DM.   - Outpatient follow-up    HLD  - Continue PTA atorvastatin   - Not on asa PTA    Type II DM  - Hold PTA Metformin     Diet: NPO per Anesthesia Guidelines for Procedure/Surgery Except for: Meds  Clear Liquid Diet  DVT Prophylaxis: Pneumatic Compression Devices  Flanagan Catheter: not present  Code Status: Full Code         Disposition Plan   Expected discharge: 2 - 3 days, recommended to prior living arrangement once hemoglobin stable.  Entered: Tawnya Ball MD 12/07/2020, 6:59  PM     The patient's care was discussed with the Patient.    Tawnya Ball MD  [facility]  Contact information available via [facility] Paging/Directory      ______________________________________________________________________    Chief Complaint   Chest pain, shortness of breath, weakness, dark stools.    History is obtained from the patient    History of Present Illness   Chu Harvey is a 60 year old male who presents to the emergency department today with 4 to 5 days of intermittent chest pain, shortness of breath and weakness.  This period of time he has also noted black-colored stools.  Reports that his chest pain is primarily located over the left side of his chest, it is non-radiating.  Chest pain and shortness only comes on with exertion.  It subsides with rest.  Over the last 2 days he has barely been able to walk up even a couple steps before having to stop to rest.    He currently has no symptoms while resting in bed.     Denies any abdominal pain, nausea, vomiting, diarrhea.  He denies recent weight loss.  His appetite has been good.  He does have a long history of smoking.  He quit about 2 years ago but prior to that had smoked up to two packs per day for the past 30 years.  He denies any alcohol use.    No known family history of GI bleeding, malignancy, or heart problems.      Review of Systems    The 10 point Review of Systems is negative other than noted in the HPI or here.     Past Medical History    I have reviewed this patient's medical history and updated it with pertinent information if needed.   Past Medical History:   Diagnosis Date     Diabetes mellitus, type 2 (H)      HLD (hyperlipidemia)      Personal history of tobacco use, presenting hazards to health     quit 2 years ago       Past Surgical History   I have reviewed this patient's surgical history and updated it with pertinent information if needed.  No past surgical history on file.    Social History   I  have reviewed this patient's social history and updated it with pertinent information if needed.  Social History     Tobacco Use     Smoking status: Former Smoker     Quit date: 2018     Years since quittin.9     Smokeless tobacco: Never Used     Tobacco comment: up to 2ppd for 30 years   Substance Use Topics     Alcohol use: Never     Frequency: Never     Binge frequency: Never     Drug use: Not Currently       Family History   No known family history of cardiac disease, GI bleeding, or Malignancy     Prior to Admission Medications   Prior to Admission Medications   Prescriptions Last Dose Informant Patient Reported? Taking?   atorvastatin (LIPITOR) 20 MG tablet 2020 at lunch Spouse/Significant Other Yes Yes   Sig: Take 20 mg by mouth daily (with dinner)   metFORMIN (GLUCOPHAGE-XR) 500 MG 24 hr tablet 2020 at lunch 1st Spouse/Significant Other Yes Yes   Sig: Take 500 mg by mouth 2 times daily (with meals)      Facility-Administered Medications: None     Allergies   No Known Allergies    Physical Exam   Vital Signs: Temp: 98.1  F (36.7  C) Temp src: Oral BP: 135/71 Pulse: 89   Resp: 18 SpO2: 99 % O2 Device: None (Room air)    Weight: 0 lbs 0 oz    General Appearance: Awake, alert, no acute distress  Eyes: Pupils are equal round, EOMI  Respiratory: No increased work of breathing  Cardiovascular: Regular rate and rhythm, no murmurs appreciated  GI: Normal bowel sounds, soft, nontender, nondistended  Skin: Appears pale, not diaphoretic  Musculoskeletal: No musculoskeletal abnormalities  Neurologic: CN II through XII intact, moves all extremities  Psychiatric: Mood and affect    Data   Data reviewed today: I reviewed all medications, new labs and imaging results over the last 24 hours.     Recent Labs   Lab 20  1644   WBC  --  11.9*   HGB  --  6.6*   MCV  --  93   PLT  --  247   INR  --  0.97   NA  --  136   POTASSIUM 3.4 3.5   CHLORIDE  --  102   CO2  --  26   BUN  --  20   CR  --   0.77   ANIONGAP  --  8   VINCE  --  8.8   GLC  --  116*   ALBUMIN  --  3.4   PROTTOTAL  --  6.4*   BILITOTAL  --  0.4   ALKPHOS  --  39*   ALT  --  24   AST  --  15   TROPI  --  <0.015     Recent Results (from the past 24 hour(s))   XR Chest Port 1 View    Narrative    XR CHEST PORT 1 VW 12/7/2020 5:40 PM    HISTORY: chest pain dyspna    COMPARISON: Chest x-ray 12/7/2020    FINDINGS:  No acute findings. The lungs are clear and there are no  pleural effusions. Normal heart size.    PATI MENDOZA MD

## 2020-12-08 NOTE — PROGRESS NOTES
[facility]    Hospitalist Progress Note    Assessment & Plan   Chu Harvey is a 60 year old male with past medical history significant for type II DM, HLD, tobacco use admitted on 12/7/2020 with likely upper GI bleed and acute anemia.      Acute blood loss anemia   Probable Upper GI bleed   Pt presented from clinic today with exertional chest pain, shortness of breath and weakness. He also endorsed black colored stools for the past week. In the ED his hemoglobin was found to be 6.6 (baseline is 14-15). Presentation likely 2/2 bleeding gastric or duodenal ulcer, though malignancy is on the differential given his smoking hx. Pt received 2 units of blood in the ED. He is hemodynamically stable.   - EGD this afternoon, showed non-bleeding gastric ulcer and bleeding duodenal ulcer, treated.  -Per GI, recommend BID Protonix, f/u in six weeks with repeat endoscopy.  No current plan for colonoscopy.     Chest pain   - Consider outpatient follow-up/repeat stress testing when GI bleeding has resolved      Probable peripheral vascular disease/claudication  Pt describes what sounds like vascular claudication in his legs bilaterally that has been present even prior to the last 4-5 days. At risk for PVD given hx of tobacco use and type II DM.   - Outpatient follow-up     HLD  - Continue PTA atorvastatin   - Not on asa PTA     Type II DM  - Hold PTA Metformin    DVT Prophylaxis: Pneumatic Compression Devices  Code Status: Full Code    Disposition: Probably home tomorrow if tolerating diet and hgb stable.    Attempted to reach his spouse Iinh for update but was unable to reach her.    Bianka Arrieta MD  Text Page (7am to 6pm)    Interval History   Chart reviewed, uneventful night.  Mild abdominal pain.  No SOB, f/c.    -Data reviewed today: I reviewed all new labs and imaging results over the last 24 hours. I personally reviewed no images or EKG's today.    Physical Exam   Temp: 98.1  F (36.7  C) Temp  src: Oral BP: 99/57 Pulse: 78   Resp: 14 SpO2: 96 % O2 Device: None (Room air)    Vitals:    12/07/20 2044   Weight: 68 kg (150 lb)     Vital Signs with Ranges  Temp:  [98  F (36.7  C)-98.4  F (36.9  C)] 98.1  F (36.7  C)  Pulse:  [78-91] 78  Resp:  [14-18] 14  BP: ()/(57-77) 99/57  SpO2:  [96 %-100 %] 96 %  I/O last 3 completed shifts:  In: 500 [P.O.:200]  Out: -     Constitutional: Awake, alert, cooperative, no apparent distress  Respiratory: Clear to auscultation bilaterally, no crackles or wheezing  Cardiovascular: Regular rate and rhythm, normal S1 and S2, and no murmur noted  GI: Normal bowel sounds, soft, non-distended, non-tender  Skin/Integumen: No rashes, no cyanosis, no edema  Other:      Medications     sodium chloride 100 mL/hr at 12/08/20 1027       atorvastatin  20 mg Oral Daily with supper     pantoprazole (PROTONIX) IV  40 mg Intravenous Q12H     sodium chloride (PF)  3 mL Intracatheter Q8H       Data   Recent Labs   Lab 12/08/20  1320 12/08/20  0630 12/08/20  0209 12/07/20 2007 12/07/20  1644   WBC  --   --   --   --  11.9*   HGB 8.1* 8.5* 8.7*  --  6.6*   MCV  --   --   --   --  93   PLT  --   --   --   --  247   INR  --   --   --   --  0.97   NA  --  140  --   --  136   POTASSIUM  --  3.8 3.9 3.4 3.5   CHLORIDE  --  108  --   --  102   CO2  --  30  --   --  26   BUN  --  14  --   --  20   CR  --  0.81  --   --  0.77   ANIONGAP  --  2*  --   --  8   VINCE  --  8.1*  --   --  8.8   GLC  --  119*  --   --  116*   ALBUMIN  --   --   --   --  3.4   PROTTOTAL  --   --   --   --  6.4*   BILITOTAL  --   --   --   --  0.4   ALKPHOS  --   --   --   --  39*   ALT  --   --   --   --  24   AST  --   --   --   --  15   TROPI  --   --   --   --  <0.015       Imaging:   Recent Results (from the past 24 hour(s))   XR Chest Port 1 View    Narrative    XR CHEST PORT 1 VW 12/7/2020 5:40 PM    HISTORY: chest pain dyspna    COMPARISON: Chest x-ray 12/7/2020    FINDINGS:  No acute findings. The lungs are clear and  there are no  pleural effusions. Normal heart size.    PATI MENDOZA MD

## 2020-12-08 NOTE — ED NOTES
[facility]  ED Nurse Handoff Report    ED Chief complaint: Generalized Weakness      ED Diagnosis:   Final diagnoses:   Gastrointestinal hemorrhage, unspecified gastrointestinal hemorrhage type   Chest pain, unspecified type       Code Status: Full Code    Allergies: No Known Allergies    Patient Story: Patient comes in via clinic for reports of chest pain.  Patient reports that he has had weakness and SOB over the last week or so.   Focused Assessment:  Patient appears pale.  Patient reports having dark black stools for the last week or so.  Patient denies any vomiting or abdominal pain. Patient denies chest pain.  Patient has regular COVID-19 testing done for his job.  He gets tired and SOB on exertion.  Patients vitals have been stable since arrival.  Patient speaks Cantonese.     Treatments and/or interventions provided: IV, labs, blood transfusion   Patient's response to treatments and/or interventions: tolerated well     To be done/followed up on inpatient unit:  see in patient orders     Does this patient have any cognitive concerns?: none     Activity level - Baseline/Home:  Independent  Activity Level - Current:   Independent    Patient's Preferred language: Cantonese   Needed?: Yes    Isolation: None  Infection: Not Applicable  Patient tested for COVID 19 prior to admission: YES  Bariatric?: No    Vital Signs:   Vitals:    12/07/20 1700 12/07/20 1730 12/07/20 1800 12/07/20 1830   BP: 117/71 122/71 121/71 138/76   Pulse: 88 89 90 89   Resp: 14 18 14 14   Temp:       TempSrc:       SpO2: 99% 100% 99% 99%       Cardiac Rhythm:     Was the PSS-3 completed:   Yes  What interventions are required if any?               Family Comments: wife   OBS brochure/video discussed/provided to patient/family: N/A              Name of person given brochure if not patient:               Relationship to patient:     For the majority of the shift this patient's behavior was Green.   Behavioral  interventions performed were .    ED NURSE PHONE NUMBER: *[phone]

## 2020-12-08 NOTE — PLAN OF CARE
[id number]. VSS. A/O. 2nd unit of blood infusing. Denies sob or chest pain. K+ replaced, recheck @ 0100. Tolerating clears. Spouse likes to get update Q day from MD. Tele SR. Pending asympomatic covid test pending. NPO midnight. EGD tomorrow.

## 2020-12-08 NOTE — UTILIZATION REVIEW
Admission Status; Secondary Review Determination       Under the authority of the Utilization Management Committee, the utilization review process indicated a secondary review on the above patient. The review outcome is based on review of the medical records, discussions with staff, and applying clinical experience noted on the date of the review.     (x) Inpatient Status Appropriate - This patient's medical care is consistent with medical management for inpatient care and reasonable inpatient medical practice.     RATIONALE FOR DETERMINATION   60 year old male who has a past medical history of type II DM, HLD, tobacco use presented to ED on 12/7/2020 with 4-5 days of intermittent chest pain, shortness of breast and weakness. He reports that he noted dark tarry stools. He reports chest pain occurs with exertion, does note extreme sob with just a few steps. In the ED his hemoglobin was found to be 6.6 (baseline is 14-15). Presentation likely 2/2 bleeding gastric or duodenal ulcer, though malignancy is on the differential given his smoking hx. this is a significant drop in hemoglobin from baseline almost 8 g, very concerning presentation especially given chest pain and severe dyspnea with minimal exertion. The expected length of stay at the time of admission was more than 2 nights because of the severity of illness, intensity of service provided, and risk for adverse outcome. Inpatient admission is appropriate.     This document was produced using voice recognition software       The information on this document is developed by the utilization review team in order for the business office to ensure compliance. This only denotes the appropriateness of proper admission status and does not reflect the quality of care rendered.   The definitions of Inpatient Status and Observation Status used in making the determination above are those provided in the CMS Coverage Manual, Chapter 1 and Chapter 6, section 70.4.    Sincerely,   JACKSON REINOSO MD   System Medical Director   Utilization Management   [facility].

## 2020-12-08 NOTE — CONSULTS
Symptomatic anemia with melena.  Likely Upper GI bleed.  I/V Protonix, serial Hb  Schedule EGD tomorrow.  R/O COVID.    Isma RANGEL

## 2020-12-08 NOTE — PROGRESS NOTES
RECEIVING UNIT ED HANDOFF REVIEW    ED Nurse Handoff Report was reviewed by: Sigifredo Roa RN on December 7, 2020 at 6:57 PM

## 2020-12-08 NOTE — PLAN OF CARE
Summary: Admitted with possible upper GI bleed  DATE & TIME: 12/07-12/08/20 Night shift   Cognitive Concerns/ Orientation : alert and oriented. Bilingual in Cantonese/English   BEHAVIOR & AGGRESSION TOOL COLOR: GREEN  CIWA SCORE: NA   ABNL VS/O2: WDL on RA  MOBILITY: SBA-FR  PAIN MANAGMENT: none needed  DIET: NPO  BOWEL/BLADDER: Continent  ABNL LAB/BG: Hgb was 6.8-recheck at 8.7 after 2 units of RBCs  DRAIN/DEVICES: IV on R arm infusing  ml/hr  TELEMETRY RHYTHM: NSR  SKIN: intact  TESTS/PROCEDURES: EGD today  D/C DAY/GOALS/PLACE: Will have EGD today.   OTHER IMPORTANT INFO: Denies any dizziness anymore- still minor chest pain.

## 2020-12-08 NOTE — PROGRESS NOTES
Arrival to floor. Nursing assistant in to settle patient. Awaiting admission orders, none signed/held at this time. Will report to oncoming RN and review.

## 2020-12-09 VITALS
HEIGHT: 67 IN | HEART RATE: 72 BPM | DIASTOLIC BLOOD PRESSURE: 55 MMHG | SYSTOLIC BLOOD PRESSURE: 106 MMHG | WEIGHT: 150 LBS | TEMPERATURE: 98.2 F | OXYGEN SATURATION: 97 % | BODY MASS INDEX: 23.54 KG/M2 | RESPIRATION RATE: 16 BRPM

## 2020-12-09 PROCEDURE — 250N000013 HC RX MED GY IP 250 OP 250 PS 637: Performed by: INTERNAL MEDICINE

## 2020-12-09 PROCEDURE — 99238 HOSP IP/OBS DSCHRG MGMT 30/<: CPT | Performed by: INTERNAL MEDICINE

## 2020-12-09 RX ORDER — PANTOPRAZOLE SODIUM 40 MG/1
40 TABLET, DELAYED RELEASE ORAL
Qty: 60 TABLET | Refills: 1 | Status: SHIPPED | OUTPATIENT
Start: 2020-12-09 | End: 2020-12-09

## 2020-12-09 RX ORDER — PANTOPRAZOLE SODIUM 40 MG/1
40 TABLET, DELAYED RELEASE ORAL
Qty: 60 TABLET | Refills: 1 | Status: SHIPPED | OUTPATIENT
Start: 2020-12-09

## 2020-12-09 RX ADMIN — PANTOPRAZOLE SODIUM 40 MG: 40 TABLET, DELAYED RELEASE ORAL at 06:43

## 2020-12-09 ASSESSMENT — ACTIVITIES OF DAILY LIVING (ADL)
ADLS_ACUITY_SCORE: 16
ADLS_ACUITY_SCORE: 17

## 2020-12-09 NOTE — PROGRESS NOTES
[facility]  Gastroenterology Progress Note     Chu Harvey MRN# [id number]   YOB: 1960 Age: 60 year old          Assessment and Plan:   Chu Harvey is a pleasant 60 year old male with melena and symptomatic anemia. GI consulted on 12/7/2020.      Active Problems:    Gastrointestinal hemorrhage, unspecified gastrointestinal hemorrhage type    Melena    Acute blood loss anemia   Patient has had presenting hemoglobin at 6.6, baseline 15. Stools melenotic. Symptomatic with sob, chest pain on exertion.   12/8/2020 EGD noted spurting duodenal ulcer with visible vessel, Injected, treated with APC and bipolar cautery.   Hemoglobin stable and improved to 8.7.  -- Regular diet  -- Continue  on oral pantoprazole 40 mg BID and continue as an outpatient  -- Ok with [facility] to discharge patient with plans to f/u as an outpatient in 1-2 weeks  -- Repeat EGD in 8 weeks to check for healing and perform biopsies for H pylori  -- Patient has never had screening colonoscopy will recommend to have done soon             Gastrointestinal hemorrhage, unspecified gastrointestinal hemorrhage type  Chest pain, unspecified type      Interval History:   no new complaints, doing well, denies shortness of breath, denies abdominal pain, pain is controlled, alert, oriented to person, place and time and doing well; no cp, sob, n/v/d, or abd pain.              Review of Systems:   C: NEGATIVE for fever, chills, change in weight  E/M: NEGATIVE for ear, mouth and throat problems  R: NEGATIVE for significant cough or SOB  CV: NEGATIVE for chest pain, palpitations or peripheral edema             Medications:   I have reviewed this patient's current medications    atorvastatin  20 mg Oral Daily with supper     pantoprazole  40 mg Oral BID AC     sodium chloride (PF)  3 mL Intracatheter Q8H                  Physical Exam:   Vitals were reviewed  Vital Signs with Ranges  Temp:  [97.7  F (36.5  C)-98.4  F (36.9  C)]  98.2  F (36.8  C)  Pulse:  [66-98] 72  Resp:  [7-25] 16  BP: ()/(55-90) 106/55  SpO2:  [95 %-100 %] 97 %  I/O last 3 completed shifts:  In: 2874 [P.O.:240; I.V.:2634]  Out: -   Constitutional: healthy, alert and no distress   Cardiovascular: negative, PMI normal. No lifts, heaves, or thrills. RRR. No murmurs, clicks gallops or rub  Respiratory: negative, Percussion normal. Good diaphragmatic excursion. Lungs clear  Head: Normocephalic. No masses, lesions, tenderness or abnormalities  Neck: Neck supple. No adenopathy. Thyroid symmetric, normal size,, Carotids without bruits.  Abdomen: Abdomen soft, non-tender. BS normal. No masses, organomegaly           Data:   I reviewed the patient's new clinical lab test results.   Recent Labs   Lab Test 12/08/20  1852 12/08/20  1320 12/08/20  0630 12/07/20  1644 12/07/20  1644   WBC  --   --   --   --  11.9*   HGB 8.7* 8.1* 8.5*   < > 6.6*   MCV  --   --   --   --  93   PLT  --   --   --   --  247   INR  --   --   --   --  0.97    < > = values in this interval not displayed.     Recent Labs   Lab Test 12/08/20  0630 12/08/20  0209 12/07/20 2007 12/07/20  1644   POTASSIUM 3.8 3.9 3.4 3.5   CHLORIDE 108  --   --  102   CO2 30  --   --  26   BUN 14  --   --  20   ANIONGAP 2*  --   --  8     Recent Labs   Lab Test 12/07/20  1644   ALBUMIN 3.4   BILITOTAL 0.4   ALT 24   AST 15       I reviewed the patient's new imaging results.    All laboratory data reviewed  All imaging studies reviewed by me.    Ashley Sánchez PA-C,  12/9/2020  [facility]  Office : [phone]  Cell: [phone] (Dr. Adan)  Cell: [phone] (Ashley Sánchez PA-C)

## 2020-12-09 NOTE — DISCHARGE INSTRUCTIONS
Please reach out to your primary care physician (PCP) for a hospital follow up within 7 days from discharge date.  Please bring your After Visit Summary (packet of papers given at discharge) to your PCP visit.

## 2020-12-09 NOTE — PLAN OF CARE
Cognitive Concerns/ Orientation : alert and oriented. Bilingual in Cantonese/English   BEHAVIOR & AGGRESSION TOOL COLOR: GREEN  CIWA SCORE: NA   ABNL VS/O2: VSS on RA  MOBILITY: SBA  PAIN MANAGMENT: Denies any pain  DIET: Regular  BOWEL/BLADDER: Continent  ABNL LAB/BG:  Hgb 8.7  DRAIN/DEVICES: IV on R arm infusing  ml/hr  TELEMETRY RHYTHM: NSR  SKIN: intact  TESTS/PROCEDURES: S/p EGD yesterday(12/8)  D/C DAY/GOALS/PLACE: Possibly home today if tolerating diet and if Hgb stable  OTHER IMPORTANT INFO: No concerns overnight.

## 2020-12-09 NOTE — PLAN OF CARE
Discharge    Patient discharged to home via wheel chair to door 2 with wife.    Cognitive Concerns/ Orientation : AOx4  Bilingual in Cantonese/English   BEHAVIOR & AGGRESSION TOOL COLOR: GREEN  CIWA SCORE: NA   ABNL VS/O2: WDL on RA  MOBILITY: SBA-FR  PAIN MANAGMENT: denies  DIET: regular  BOWEL/BLADDER: Continent  ABNL LAB/B units overnight last night. Hgb 8.7 today  DRAIN/DEVICES: IV removed  TELEMETRY RHYTHM: NSR, discontinued.  SKIN: intact  TESTS/PROCEDURES: EGD yesterday. No further plans for colonoscopy. Ulcer cauterized.   D/C DAY/GOALS/PLACE: home--today    Listed belongings gathered and returned to patient. Yes  Care Plan and Patient education resolved: Yes  Prescriptions if needed, hard copies sent with patient  NA  Home and hospital acquired medications returned to patient: Yes  Medication Bin checked and emptied on discharge Yes  Follow up appointment made for patient: Yes     OTHER IMPORTANT INFO:   Continue  on oral pantoprazole 40 mg BID and continue as an outpatient  -- Ok with [facility] to discharge patient with plans to f/u as an outpatient in 1-2 weeks  -- Repeat EGD in 8 weeks to check for healing and perform biopsies for H pylori  -- Patient has never had screening colonoscopy GI will recommend to have done soon.    Patient will make own PCP follow up appointment.

## 2020-12-10 NOTE — DISCHARGE SUMMARY
[facility]    Discharge Summary  Hospitalist    Date of Admission:  12/7/2020  Date of Discharge:  12/9/2020  3:45 PM  Discharging Provider: Bianka Arrieta MD    Discharge Diagnoses   Upper GI Bleed  Bleeding duodenal ulcer  Non bleeding gastric ulcer  Acute blood loss anemia    History of Present Illness   Chu Harvey is a 60 year old male who presents to the emergency department today with 4 to 5 days of intermittent chest pain, shortness of breath and weakness.  This period of time he has also noted black-colored stools.  Reports that his chest pain is primarily located over the left side of his chest, it is non-radiating.  Chest pain and shortness only comes on with exertion.  It subsides with rest.  Over the last 2 days he has barely been able to walk up even a couple steps before having to stop to rest.     Denies any abdominal pain, nausea, vomiting, diarrhea.  He denies recent weight loss.  His appetite has been good.  He does have a long history of smoking.  He quit about 2 years ago but prior to that had smoked up to two packs per day for the past 30 years.  He denies any alcohol use.  For further details regarding his presentation, please refer to Dr. Ball's admit note.    Hospital Course   Acute blood loss anemia   Probable Upper GI bleed   Pt presented from clinic today with exertional chest pain, shortness of breath and weakness. He also endorsed black colored stools for the past week. In the ED his hemoglobin was found to be 6.6 (baseline is 14-15). Presentation likely 2/2 bleeding gastric or duodenal ulcer, though malignancy is on the differential given his smoking hx. Pt received 2 units of blood in the ED.  --Started on IV protonix, underwent endoscopy with APC and injection of bleeding duodenal ulcer.  Hemoglobin responded appropriately to transfusion, last recheck was 8.7.  -Discharged with BID protonix.  Follow up with PCP in one week and will be contacted by GI (  [facility]) for follow-up procedure.          Bianka Arrieta MD    Significant Results and Procedures   Upper endoscopy revealed non-bleeding gastric ulcer, and actively bleeding duodenal ulcer--injected and cauterized.      Unresulted Labs Ordered in the Past 30 Days of this Admission     No orders found from 11/7/2020 to 12/8/2020.          Code Status   Full Code       Primary Care Physician   [facility]    Physical Exam   Temp: 98.2  F (36.8  C) Temp src: Oral BP: 106/55 Pulse: 72   Resp: 16 SpO2: 97 % O2 Device: None (Room air)    Vitals:    12/07/20 2044   Weight: 68 kg (150 lb)     Vital Signs with Ranges  Temp:  [98.2  F (36.8  C)-98.4  F (36.9  C)] 98.2  F (36.8  C)  Pulse:  [72-88] 72  Resp:  [16] 16  BP: (106-113)/(55-64) 106/55  SpO2:  [97 %-98 %] 97 %  I/O last 3 completed shifts:  In: 2179 [P.O.:480; I.V.:1699]  Out: -     Constitutional: Awake, alert, cooperative, no apparent distress.  Eyes: Conjunctiva and pupils examined and normal.  HEENT: Moist mucous membranes, normal dentition.  Respiratory: Clear to auscultation bilaterally, no crackles or wheezing.  Cardiovascular: Regular rate and rhythm, normal S1 and S2  GI: Soft, non-distended, non-tender      Discharge Disposition   Discharged to home  Condition at discharge: Stable    Consultations This Hospital Stay   GASTROENTEROLOGY IP CONSULT    Time Spent on this Encounter   I, Bianka Arrieta MD, personally saw the patient today and spent less than or equal to 30 minutes discharging this patient.    Discharge Orders      Reason for your hospital stay    GI Bleed, Ulcers     Follow-up and recommended labs and tests     F/U with PCP as needed.  Will be contacted by Gastroenterology for repeat endoscopy in 4-6 weeks.     Activity    Your activity upon discharge: activity as tolerated     Full Code     Diet    Follow this diet upon discharge: Regular     Discharge Medications   Discharge Medication List as of 12/9/2020  1:38  PM      START taking these medications    Details   pantoprazole (PROTONIX) 40 MG EC tablet Take 1 tablet (40 mg) by mouth 2 times daily (before meals), Disp-60 tablet, R-1, E-Prescribe         CONTINUE these medications which have NOT CHANGED    Details   atorvastatin (LIPITOR) 20 MG tablet Take 20 mg by mouth daily (with dinner), Historical      metFORMIN (GLUCOPHAGE-XR) 500 MG 24 hr tablet Take 500 mg by mouth 2 times daily (with meals), Historical           Allergies   No Known Allergies  Data   Most Recent 3 CBC's:  Recent Labs   Lab Test 12/08/20  1852 12/08/20  1320 12/08/20  0630 12/07/20  1644 12/07/20  1644   WBC  --   --   --   --  11.9*   HGB 8.7* 8.1* 8.5*   < > 6.6*   MCV  --   --   --   --  93   PLT  --   --   --   --  247    < > = values in this interval not displayed.

## 2020-12-23 ENCOUNTER — HOSPITAL ENCOUNTER (EMERGENCY)
Facility: CLINIC | Age: 60
Discharge: HOME | End: 2020-12-23
Attending: EMERGENCY MEDICINE | Admitting: EMERGENCY MEDICINE
Payer: COMMERCIAL

## 2020-12-23 ENCOUNTER — APPOINTMENT (OUTPATIENT)
Dept: GENERAL RADIOLOGY | Facility: CLINIC | Age: 60
End: 2020-12-23
Attending: EMERGENCY MEDICINE
Payer: COMMERCIAL

## 2020-12-23 VITALS
HEIGHT: 67 IN | SYSTOLIC BLOOD PRESSURE: 150 MMHG | TEMPERATURE: 98.1 F | HEART RATE: 99 BPM | BODY MASS INDEX: 24.01 KG/M2 | WEIGHT: 153 LBS | RESPIRATION RATE: 18 BRPM | DIASTOLIC BLOOD PRESSURE: 83 MMHG

## 2020-12-23 DIAGNOSIS — K59.00 CONSTIPATION, UNSPECIFIED CONSTIPATION TYPE: ICD-10-CM

## 2020-12-23 LAB
ALBUMIN SERPL-MCNC: 4.3 G/DL (ref 3.4–5)
ALP SERPL-CCNC: 66 U/L (ref 40–150)
ALT SERPL W P-5'-P-CCNC: 29 U/L (ref 0–70)
ANION GAP SERPL CALCULATED.3IONS-SCNC: 2 MMOL/L (ref 3–14)
AST SERPL W P-5'-P-CCNC: 20 U/L (ref 0–45)
BASOPHILS # BLD AUTO: 0.1 10E9/L (ref 0–0.2)
BASOPHILS NFR BLD AUTO: 1.4 %
BILIRUB SERPL-MCNC: 0.5 MG/DL (ref 0.2–1.3)
BUN SERPL-MCNC: 15 MG/DL (ref 7–30)
CALCIUM SERPL-MCNC: 9.4 MG/DL (ref 8.5–10.1)
CHLORIDE SERPL-SCNC: 106 MMOL/L (ref 94–109)
CO2 SERPL-SCNC: 30 MMOL/L (ref 20–32)
CREAT SERPL-MCNC: 0.8 MG/DL (ref 0.66–1.25)
DIFFERENTIAL METHOD BLD: ABNORMAL
EOSINOPHIL # BLD AUTO: 0 10E9/L (ref 0–0.7)
EOSINOPHIL NFR BLD AUTO: 0.7 %
ERYTHROCYTE [DISTWIDTH] IN BLOOD BY AUTOMATED COUNT: 13.6 % (ref 10–15)
GFR SERPL CREATININE-BSD FRML MDRD: >90 ML/MIN/{1.73_M2}
GLUCOSE SERPL-MCNC: 100 MG/DL (ref 70–99)
HCT VFR BLD AUTO: 33.2 % (ref 40–53)
HGB BLD-MCNC: 10.3 G/DL (ref 13.3–17.7)
IMM GRANULOCYTES # BLD: 0 10E9/L (ref 0–0.4)
IMM GRANULOCYTES NFR BLD: 0.2 %
INR PPP: 0.89 (ref 0.86–1.14)
LYMPHOCYTES # BLD AUTO: 1.3 10E9/L (ref 0.8–5.3)
LYMPHOCYTES NFR BLD AUTO: 22.1 %
MCH RBC QN AUTO: 28.1 PG (ref 26.5–33)
MCHC RBC AUTO-ENTMCNC: 31 G/DL (ref 31.5–36.5)
MCV RBC AUTO: 91 FL (ref 78–100)
MONOCYTES # BLD AUTO: 0.4 10E9/L (ref 0–1.3)
MONOCYTES NFR BLD AUTO: 7.2 %
NEUTROPHILS # BLD AUTO: 4 10E9/L (ref 1.6–8.3)
NEUTROPHILS NFR BLD AUTO: 68.4 %
NRBC # BLD AUTO: 0 10*3/UL
NRBC BLD AUTO-RTO: 0 /100
PLATELET # BLD AUTO: 511 10E9/L (ref 150–450)
POTASSIUM SERPL-SCNC: 3.9 MMOL/L (ref 3.4–5.3)
PROT SERPL-MCNC: 8.3 G/DL (ref 6.8–8.8)
RBC # BLD AUTO: 3.66 10E12/L (ref 4.4–5.9)
SODIUM SERPL-SCNC: 138 MMOL/L (ref 133–144)
WBC # BLD AUTO: 5.8 10E9/L (ref 4–11)

## 2020-12-23 PROCEDURE — 85610 PROTHROMBIN TIME: CPT | Performed by: EMERGENCY MEDICINE

## 2020-12-23 PROCEDURE — 80053 COMPREHEN METABOLIC PANEL: CPT | Performed by: EMERGENCY MEDICINE

## 2020-12-23 PROCEDURE — 99284 EMERGENCY DEPT VISIT MOD MDM: CPT

## 2020-12-23 PROCEDURE — 74019 RADEX ABDOMEN 2 VIEWS: CPT

## 2020-12-23 PROCEDURE — 85025 COMPLETE CBC W/AUTO DIFF WBC: CPT | Performed by: EMERGENCY MEDICINE

## 2020-12-23 PROCEDURE — 250N000013 HC RX MED GY IP 250 OP 250 PS 637: Performed by: EMERGENCY MEDICINE

## 2020-12-23 RX ORDER — SENNA AND DOCUSATE SODIUM 50; 8.6 MG/1; MG/1
1 TABLET, FILM COATED ORAL
Qty: 30 TABLET | Refills: 0 | Status: SHIPPED | OUTPATIENT
Start: 2020-12-23

## 2020-12-23 RX ORDER — DOCUSATE SODIUM 100 MG/1
100 CAPSULE, LIQUID FILLED ORAL 2 TIMES DAILY
Qty: 60 CAPSULE | Refills: 0 | Status: SHIPPED | OUTPATIENT
Start: 2020-12-23

## 2020-12-23 RX ADMIN — DOCUSATE SODIUM 286 ML: 50 LIQUID ORAL at 01:40

## 2020-12-23 ASSESSMENT — ENCOUNTER SYMPTOMS
CONSTIPATION: 1
ABDOMINAL PAIN: 1
VOMITING: 0
DYSURIA: 0

## 2020-12-23 ASSESSMENT — MIFFLIN-ST. JEOR: SCORE: 1467.63

## 2020-12-23 NOTE — ED AVS SNAPSHOT
[facility]  [address]  [facility] [address]  Phone: [phone]  Fax: [phone]                                    Chu Harvey   MRN: [id number]    Department: [facility]   Date of Visit: 12/23/2020           After Visit Summary Signature Page    I have received my discharge instructions, and my questions have been answered. I have discussed any challenges I see with this plan with the nurse or doctor.    ..........................................................................................................................................  Patient/Patient Representative Signature      ..........................................................................................................................................  Patient Representative Print Name and Relationship to Patient    ..................................................               ................................................  Date                                   Time    ..........................................................................................................................................  Reviewed by Signature/Title    ...................................................              ..............................................  Date                                               Time          22EPIC Rev 08/18

## 2020-12-23 NOTE — ED NOTES
RN talked with wife on phone to give update. Wife sitting in car in parking, would like updates via cell phone.

## 2020-12-23 NOTE — ED TRIAGE NOTES
Here for concern of constipation, unable to have a bowel movement since 9pm. Stated last normal bowel movement 4 days ago. Patient was seen here in November for same complaint. Also stated neck and back of head pain stasrted 2 days ago. ABCs intact.

## 2020-12-23 NOTE — ED PROVIDER NOTES
"History   Chief Complaint:  Constipation     HPI   Chu Harvey is a 60 year old male with history of diabetes and hyperlipidemia who presents with constipation. The patient reports 3.5 days ago he developed constipation. He notes he has not had any bowel movements but he is still passing gas. He notes a similar episode of this 2 months ago. He endorses lower abdominal pain which is present when he tries to poop. He denies vomiting and dysuria.    Per chart review, on 12/07/2020 the patient was hospitalized secondary to a gastrointestinal hemorrhage. He presented to the ED after developing generalized weakness, shortness of breath, chest pain, and 1 week of black hues stools. While in the ED, he had blood work drawn, a XR chest, and was treated with IV Protonix. In the hospital, the patient underwent an endoscopy which revealed a non-bleeding gastric ulcer and actively bleeding duodenal ulcer which was injected and cauterized. His low hemoglobin was treated and responded well to a transfusion. He was discharged with Protonix BID.     Allergies:  No known drug allergies     Medications:  Atorvastatin   Metformin   Protonix     Past Medical History:    Diabetes   Hyperlipidemia   GI hemorrhage      Past Surgical History:    Circumcision      Family History:    Lung cancer  Cancer     Social History:  Patient presents unaccompanied to the ED.   Smoking history: former smoker - quit about 3 years ago.     Review of Systems   Gastrointestinal: Positive for abdominal pain and constipation. Negative for vomiting.   Genitourinary: Negative for dysuria.   All other systems reviewed and are negative.      Physical Exam     Patient Vitals for the past 24 hrs:   BP Temp Temp src Pulse Resp Height Weight   12/23/20 0021 (!) 150/83 98.1  F (36.7  C) Oral 99 18 1.702 m (5' 7\") 69.4 kg (153 lb)     Physical Exam    I have reviewed the triage vital signs    Eyes: No discharge, symmetrical lids  ENT: Moist mucous membranes, no ear " discharge  Neck: Full range of motion  Respiratory: CTAB, no wheezes  Cardiovascular: Regular rate and rhythm, no lower extremity edema  Chest: Equal rise  Gastrointestinal: Soft. Nondistended. Mild diffuse nonfocal TTP. No rebound or guarding  Musculoskeletal: No gross deformities.   Skin: Warm and well perfused. No visible rash.  Neurologic: Moves all extremities, speech fluent without dysarthria  Psychiatric: Appropriate affect, alert and interactive     Emergency Department Course     Imaging:  XR Abdomen, 2 views   Large amount of stool throughout the colon. No evidence for obstruction. No free air. Remainder unremarkable.   Reading per radiology    Laboratory:  CBC: RBC Count 3.66 (L), HGB 10.3 (L), Hematocrit 33.2 (L), MCHC 31.0 (L),  (H), o/w WNL (WBC 5.8)  CMP: Glucose 100 (H), Anion Gap: 2 (L), o/w WNL (Creatinine: 0.80)    INR: 0.89     Emergency Department Course:  Reviewed:  0045: I reviewed the patient's nursing notes, vitals, past medical records, and Care Everywhere.     Assessments:  0050: I performed an exam of the patient as documented above.     0206: I rechecked the patient and discussed their ED workup thus far.      Interventions:  0140 Pink Lady Enema 286 mL Rectal    Disposition:  Discharged to home.    Impression & Plan       Medical Decision MakinM presenting with constipation and mild abdominal pain.  Recently admitted for GIB, found to have bleeding duodenal ulcer, currently on protonix, not taking anything currently for constipation.  DDx includes but is not limited to constipation, GIB, ABLA, doubt obstruction  AXR obtained, showing large stool burden, non obstructive bowel gas pattern.  Labs obtained, given recent hospitalization for GIB.  Hgb is uptrending and reassuring at 10.3.  Remainder of labs are reassuring.  Pt was given a pink lady enema with large BM.  Scant reddish tinge to stool -- pt states he has been eating a lot of beets, which could explain this.  Advised  he monitor this closely and follow-up with his PCP for stool guaic testing.  Home constipation treatment as below.  Strict return precautions given.       Diagnosis:    ICD-10-CM   1. Constipation, unspecified constipation type  K59.00     Discharge Medications:  New Prescriptions    DOCUSATE SODIUM (COLACE) 100 MG CAPSULE    Take 1 capsule (100 mg) by mouth 2 times daily    MAGNESIUM CITRATE SOLUTION    Take 296 mLs by mouth once for 1 dose    SENNA-DOCUSATE SODIUM (SENNA S) 8.6-50 MG TABLET    Take 1 tablet by mouth nightly as needed (severe constipation)     Scribe Disclosure:  Teri LOBO, am serving as a scribe at 12:28 AM on 12/23/2020 to document services personally performed by Shane Florence MD based on my observations and the provider's statements to me.      Shane Florence MD  12/23/20 0214

## 2020-12-23 NOTE — DISCHARGE INSTRUCTIONS
Please take the magnesium citrate tomorrow morning.  It will help clear out your colon.    Take the colace, start with 2 times a day.  It will soften your stool.  If it seems too soft, decrease it to once a day or every other day.  If it seems like it is not working well enough, start taking senna in addition.    All these medications are over the counter.  You can buy them at the pharmacy without a prescription if you run out and need more.

## (undated) RX ORDER — FENTANYL CITRATE 50 UG/ML
INJECTION, SOLUTION INTRAMUSCULAR; INTRAVENOUS
Status: DISPENSED
Start: 2020-12-08

## (undated) RX ORDER — EPINEPHRINE IN SOD CHLOR,ISO 1 MG/10 ML
SYRINGE (ML) INTRAVENOUS
Status: DISPENSED
Start: 2020-12-08